# Patient Record
Sex: MALE
[De-identification: names, ages, dates, MRNs, and addresses within clinical notes are randomized per-mention and may not be internally consistent; named-entity substitution may affect disease eponyms.]

---

## 2021-12-31 ENCOUNTER — HOSPITAL ENCOUNTER (EMERGENCY)
Dept: HOSPITAL 5 - ED | Age: 56
Discharge: HOME | End: 2021-12-31
Payer: SELF-PAY

## 2021-12-31 VITALS — SYSTOLIC BLOOD PRESSURE: 105 MMHG | DIASTOLIC BLOOD PRESSURE: 64 MMHG

## 2021-12-31 DIAGNOSIS — L03.114: Primary | ICD-10-CM

## 2021-12-31 PROCEDURE — 96375 TX/PRO/DX INJ NEW DRUG ADDON: CPT

## 2021-12-31 PROCEDURE — 90715 TDAP VACCINE 7 YRS/> IM: CPT

## 2021-12-31 PROCEDURE — 29125 APPL SHORT ARM SPLINT STATIC: CPT

## 2021-12-31 PROCEDURE — 96365 THER/PROPH/DIAG IV INF INIT: CPT

## 2021-12-31 PROCEDURE — 99282 EMERGENCY DEPT VISIT SF MDM: CPT

## 2021-12-31 NOTE — EMERGENCY DEPARTMENT REPORT
ED Upper Extremity Inj HPI





- General


Stated Complaint: EXTREMITY INJURY


Time Seen by Provider: 12/31/21 17:19





- History of Present Illness


Initial Comments: 





Patient is a right-hand-dominant gentleman the presents with a several day 

history of left hand pain and swelling.  He was trying to take a screw out of 

some wood.  He broke the head off of the screw and then accidentally punctured 

his finger on the left hand.  This was his index finger.  He has had increasing 

pain and swelling that is now involving the entire hand.  The pain radiates up 

the left forearm.  He has no injury to left forearm.  Pain is described as 

aching and constant.  It is worse with movement and palpation.  He has not been 

elevating.  He has not been icing.  He did try Tylenol.  That did not help.  He 

came here for evaluation.  He is not sure when his last tetanus shot occurred.  

Again, he is right-hand dominant.





- Related Data


                                  Previous Rx's











 Medication  Instructions  Recorded  Last Taken  Type


 


Ibuprofen [Motrin] 800 mg PO Q8HR PRN #30 tablet 12/31/21 Unknown Rx


 


cephALEXin [Keflex] 500 mg PO Q8HR #30 cap 12/31/21 Unknown Rx











                                    Allergies











Allergy/AdvReac Type Severity Reaction Status Date / Time


 


No Known Allergies Allergy   Verified 12/31/21 17:25














ED Review of Systems


ROS: 


Stated complaint: EXTREMITY INJURY


Other details as noted in HPI





Comment: All other systems reviewed and negative


Constitutional: denies: fever


Eyes: denies: vision change


ENT: denies: throat pain


Respiratory: denies: cough


Cardiovascular: denies: chest pain


Endocrine: denies: unexplained weight loss


Gastrointestinal: denies: abdominal pain


Genitourinary: denies: dysuria


Musculoskeletal: denies: back pain


Skin: denies: rash


Neurological: denies: headache


Hematological/Lymphatic: denies: easy bruising





ED Past Medical Hx





- Past Medical History


Previous Medical History?: No





- Family History


Family history: no significant





- Medications


Home Medications: 


                                Home Medications











 Medication  Instructions  Recorded  Confirmed  Last Taken  Type


 


Ibuprofen [Motrin] 800 mg PO Q8HR PRN #30 tablet 12/31/21  Unknown Rx


 


cephALEXin [Keflex] 500 mg PO Q8HR #30 cap 12/31/21  Unknown Rx














ED Physical Exam





- General


Limitations: No Limitations, Other (Pulse ox noted and normal)


General appearance: alert, in no apparent distress





- Head


Head exam: Present: atraumatic, normocephalic





- Eye


Eye exam: Present: normal appearance, EOMI.  Absent: scleral icterus





- ENT


ENT exam: Present: normal orophraynx, normal external ear exam





- Neck


Neck exam: Present: normal inspection.  Absent: meningismus





- Respiratory


Respiratory exam: Present: normal lung sounds bilaterally.  Absent: respiratory 

distress





- Cardiovascular


Cardiovascular Exam: Present: regular rate, normal rhythm





- GI/Abdominal


GI/Abdominal exam: Present: soft.  Absent: tenderness





- Extremities Exam


Extremities exam: Present: normal capillary refill, other (Significant swelling 

to the entire left hand including all 5 digits.  This involves the hand itself. 

 There is erythema and warmth associated with this in the dorsum of the hand.  

Patient does not have pain with passive range of motion of the index finger.  

This is where the puncture wound is.).  Absent: calf tenderness





- Back Exam


Back exam: Absent: CVA tenderness (R), CVA tenderness (L)





- Neurological Exam


Neurological exam: Present: alert, oriented X3, normal gait





- Psychiatric


Psychiatric exam: Present: normal affect, normal mood





- Skin


Skin exam: Present: warm, dry





ED Course


                                   Vital Signs











  12/31/21





  17:22


 


Temperature 98.8 F


 


Respiratory 16





Rate 


 


Blood Pressure 151/87














- Reevaluation(s)


Reevaluation #1: 





12/31/21 17:19


Antibiotics and splint were ordered





- Procedure Description


Procedures done: Procedure note: Splint application.  Indication: Left hand 

cellulitis.  Patient was placed in a volar splint using Ortho-Glass. This was 

done by the ED staff with my observation. After the splint was applied, patient 

has brisk capillary refill and good sensation. This did not appear to be too 

constrictive. Patient tolerated this without difficulty. There were no 

complications. Patient appeared to be neurovascularly intact.





ED Medical Decision Making





- Medical Decision Making





Patient presents with a puncture wound to the left hand, index finger, and has 

evidence of a hand cellulitis.  He does not have symptoms that suggest a 

fusiform cellulitis or passive tenosynovitis.  He has been given antibiotics 

here.  He has been splinted for comfort.  He has been referred for outpatient 

evaluation.  Tetanus booster was updated.  This is his nondominant hand.  We 

have had a discussion about follow-up.


Critical Care Time: No


Critical care attestation.: 


If time is entered above; I have spent that time in minutes in the direct care 

of this critically ill patient, excluding procedure time.








ED Disposition


Clinical Impression: 


 Cellulitis of left hand





Disposition: 01 HOME / SELF CARE / HOMELESS


Is pt being admited?: No


Condition: Stable


Instructions:  Cellulitis, Adult


Additional Instructions: 


Wear the splint.  Ice and elevate.  Take all of the antibiotics.  Follow-up with

 the referral physician as directed.  Return for any problems or concerns.


Prescriptions: 


cephALEXin [Keflex] 500 mg PO Q8HR #30 cap


Ibuprofen [Motrin] 800 mg PO Q8HR PRN #30 tablet


 PRN Reason: Pain, Moderate (4-6)


Referrals: 


JOYCELYN COOL MD [Staff Physician] - 3-5 Days

## 2022-01-10 ENCOUNTER — HOSPITAL ENCOUNTER (EMERGENCY)
Dept: HOSPITAL 5 - ED | Age: 57
Discharge: LEFT BEFORE DISCHARGE | End: 2022-01-10
Payer: SELF-PAY

## 2022-01-10 DIAGNOSIS — Z02.1: Primary | ICD-10-CM

## 2022-01-10 DIAGNOSIS — Z53.21: ICD-10-CM
